# Patient Record
(demographics unavailable — no encounter records)

---

## 2025-02-28 NOTE — PHYSICAL EXAM
[Alert] : alert [Oriented x 3] : ~L oriented x 3 [Well Nourished] : well nourished [Conjunctiva Non-injected] : conjunctiva non-injected [No Visual Lymphadenopathy] : no visual  lymphadenopathy [No Clubbing] : no clubbing [No Edema] : no edema [No Bromhidrosis] : no bromhidrosis [No Chromhidrosis] : no chromhidrosis [FreeTextEntry3] : tense bulla overlying erythematous thin plaques on the arms, legs, neck, chest tense bulla and crusted erosions on lips erosions on soft palate scrapable yellow-white plaques on tongue

## 2025-02-28 NOTE — HISTORY OF PRESENT ILLNESS
[FreeTextEntry1] : npa - rash [de-identified] : Pt is a 82 year old F presenting for hospital follow up:  Initial Hx: - presented 2/3/25 to ED w vesiculobullous eruption on neck/chest. Pt had COVID 1 week before rash onset. No new meds. - was biopsied (HE and DIF), results pending. HSV/VZV negative.  Interim Hx: - on Prednisone 20mg BID x 2 weeks (last day today), worsening. Developing new blisters in mouth, on lips, on palms and arms/legs - not itchy but palms hurt from blisters  Meds: occasional Tylenol and Advil

## 2025-02-28 NOTE — HISTORY OF PRESENT ILLNESS
[FreeTextEntry1] : npa - rash [de-identified] : Pt is a 82 year old F presenting for hospital follow up:  Initial Hx: - presented 2/3/25 to ED w vesiculobullous eruption on neck/chest. Pt had COVID 1 week before rash onset. No new meds. - was biopsied (HE and DIF), results pending. HSV/VZV negative.  Interim Hx: - on Prednisone 20mg BID x 2 weeks (last day today), worsening. Developing new blisters in mouth, on lips, on palms and arms/legs - not itchy but palms hurt from blisters  Meds: occasional Tylenol and Advil

## 2025-02-28 NOTE — ASSESSMENT
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 time out performed. lidocaine with epinephrine used for anesthesia. punch biopsy performed with suture in place. patient tolerated procedure well without complications. aftercare instructions provided to patient. patient instructed to follow up in 2 weeks for suture removal.  #Neoplasm of Uncertain Behavior - site: R forearm distal - ddx: DIF -  r/o BP - risks/benefits/alternatives to punch biopsy discussed today and patient consented to procedure. time out performed. lidocaine with epinephrine used for anesthesia. punch biopsy performed with suture in place. patient tolerated procedure well without complications. aftercare instructions provided to patient. patient instructed to follow up in 2 weeks for suture removal.  RTC 2 weeks for SR and f/u

## 2025-03-21 NOTE — END OF VISIT
[] : Resident [Resident] : Resident [Time Spent: ___ minutes] : I have spent [unfilled] minutes of time on the encounter which excludes teaching and separately reported services.

## 2025-03-21 NOTE — ASSESSMENT
[FreeTextEntry1] : #Bullous pemphigoid, chronic, flaring   - Bx-proven  - 2/18/25 BP Abs negative but still favor BP  - Discussed all treatment options. Patient having difficulty tolerating current medications, maritza prednisone. Difficult to stay compliant and take pills daily. Has not started MMF yet due to indeterminant quant. Discussed off label dupi for BP, maritza as underlying eczematous below. Patient and daughter opt to start dupi today. If not improved enough on dupi, can consider MMF - start dupi as below - Previously on prednisone 30 mg BID --> now on 30 mg daily. would benefit from increased dose, but unable to tolerate side effects - Will decrease to Prednisone 20 mg daily for 3 weeks, then continue with 10 mg daily for 3 weeks  - SED systemic steroids were discussed including but not limited to HPA axis suppression, immunosuppression, mood changes, risk of bone fracture, GI ulcer, weight gain, fluid retention, hyperglycemia and glaucoma. Recommend calcium+vit D supplementation, pepcid - STOP doxycycline  - START clobetasol 0.05% ointment twice daily to affected areas for 2 weeks on/1 week off - Proper topical steroid use and side effects discussed, including cutaneous atrophy, telangiectasias, and striae. Avoid long-term use. - Few bullae deroofed today for pt comfort, cleaned with etoh, punctured with 18 gauge needle, tolerated well  #Eczema/Atopic dermatitis #Encounter for injection education - Dupixent  - gentle skin care, liberal bland emollients; can use TCS as needed - 2 Dupixent pens (600 mg) injected subcutaneously into L and R proximal thighs without complication  - Lot #: [id number]; Exp: 07/31/26 - Proper technique demonstrated and all questions answered, do not inject into any broken/inflamed skin - Next dose (maintenance: 300 mg) due in 2 weeks and then q2 weeks thereafter - Discussed removing Dupixent pen from fridge 30 minutes before use - Instructed pt to request refill for next and subsequent doses  - 2 additional sample pens given to pt while Dupixent approval underway via [employer] ([employer]tee)  Medication discussed as well as side effects including conjunctivitis, visual changes, dry eye/itch, hypersensitivity reaction, injection site reactions, herpes simplex infections, eosinophilia, arthralgia/arthritis, keratitis, and molluscum; avoidance of live vaccines while on the medication   #High risk medication use - Reordered Quantiferon today - will follow result. If indeterminant again, can get CXR and ID eval if plan for MMF - 2/18/25 CBC, CMP, Hep panel, Quantiferon reviewed   RTC 4-6 weeks

## 2025-03-21 NOTE — HISTORY OF PRESENT ILLNESS
[FreeTextEntry1] : F/u BP [de-identified] : Pt is an 82-year-old F presenting for hospital follow up:  1) Bullous pemphigoid, trunk, extremities - Pt noted weakness and polyuria from prednisone so she has been taking 30 mg daily (instead of twice daily) for the past 3 days but has now been flaring since - Otherwise has also been taking doxycycline one daily - Ran out of topicals - Reports itch and pain from bullae  - Denies mouth erosions but reports dry mouth   Initial Hx: - presented 2/3/25 to ED w vesiculobullous eruption on neck/chest. Pt had COVID 1 week before rash onset. No new meds. - was biopsied (HE and DIF), results pending. HSV/VZV negative.  Interim Hx: - on Prednisone 20mg BID x 2 weeks (last day today), worsening. Developing new blisters in mouth, on lips, on palms and arms/legs - not itchy but palms hurt from blisters  Meds: occasional Tylenol and Advil

## 2025-03-21 NOTE — PHYSICAL EXAM
[Alert] : alert [FreeTextEntry3] : General: well appearing person in nad, alert, pleasant Focused Skin Exam per patient preference: - Tense bulla overlying erythematous thin plaques on proximal extremities and trunk  - Oral mucosa and eyes clear - Pink scaly plaques on trunk and extremities, xerosis of legs

## 2025-05-08 NOTE — ASSESSMENT
[FreeTextEntry1] : #Favor Bullous pemphigoid, chronic, flaring   - Bx w subepidermal blisters w NEUTROPHILS  - 2/18/25 BP Abs negative  - Will order IIF to confirm diagnosis of BP and r/o EBA, bullous lupus: [facility] basement membrane zone antibody test - discussed all treatment options - c/w Dupixent (started April 2025) q 2 weeks, SED - c/w Prednisone taper 5mg daily until f/u- SED systemic steroids were discussed including but not limited to HPA axis suppression, immunosuppression, mood changes, risk of bone fracture, GI ulcer, weight gain, fluid retention, hyperglycemia and glaucoma. Recommend calcium+vit D supplementation, pepcid - START CellCept 500mg BID; SED including GI disturbance, including peptic ulceration (which is a relative contraindication to this medication), reversible bone marrow suppression, and possible teratogenic effect.. CBC and CMP baseline in HIE - Recheck CBC and diff at next visit in 2 weeks - c/w clobetasol 0.05% ointment twice daily to affected areas for 2 weeks on/1 week off - Proper topical steroid use and side effects discussed, including cutaneous atrophy, telangiectasias, and striae. Avoid long-term use.   #High risk medication use - QG negative 04/2025 during hospital stay (prev indeterminate i/s/o prednisone use) - will RECHECK CBC, CMP in 2 weeks after starting CellCept - 2/18/25 CBC, CMP, Hep panel, Quantiferon reviewed   RTC 2 weeks

## 2025-05-08 NOTE — HISTORY OF PRESENT ILLNESS
[FreeTextEntry1] : F/u BP [de-identified] : Pt is an 82-year-old F presenting for f/u:  1) AIBD- favor Bullous pemphigoid, trunk, extremities - started Dupixent 6 weeks ago, notes some improvement but still getting smaller active lesions on legs, not big blisters anymore - on Pred taper, was recently hospitalized for hyperglycemia (glucose 500s) while on Pred 10mg. Glucose is now well controlled w metformin and insulin. Currently taking Prednisone 5mg- will have for 1 more week - denies lesions in mouth and genitals  Initial Hx: - presented 2/3/25 to ED w vesiculobullous eruption on neck/chest. Pt had COVID 1 week before rash onset. No new meds. - was biopsied (HE and DIF), results pending. HSV/VZV negative.  Interim Hx: - on Prednisone 20mg BID x 2 weeks (last day today), worsening. Developing new blisters in mouth, on lips, on palms and arms/legs - not itchy but palms hurt from blisters  Meds: occasional Tylenol and Advil

## 2025-05-08 NOTE — PHYSICAL EXAM
[Alert] : alert [FreeTextEntry3] : General: well appearing person in nad, alert, pleasant Focused Skin Exam per patient preference: - Few tense cloudy bulla overlying erythematous thin plaques on thighs and lower legs, improved from prior - Oral mucosa and eyes clear - Pink scaly plaques on trunk and extremities, xerosis of legs

## 2025-05-30 NOTE — HISTORY OF PRESENT ILLNESS
[FreeTextEntry1] : F/u BP [de-identified] : Pt is an 82-year-old F presenting for f/u:  1) AIBD- favor p200 or antibody negative pemphigoid on trunk, extremities  Patient notes significant improvement. On dupixent for about 8 weeks now and started cellcept 500BID 2 weeks ago. tolerating well with no GI problems. She has been off pred for a couple weeks now per her niece, not flaring.  Hx from 5/8/2025: - started Dupixent 6 weeks ago, notes some improvement but still getting smaller active lesions on legs, not big blisters anymore - on Pred taper, was recently hospitalized for hyperglycemia (glucose 500s) while on Pred 10mg. Glucose is now well controlled w metformin and insulin. Currently taking Prednisone 5mg- will have for 1 more week - denies lesions in mouth and genitals  Initial Hx: - presented 2/3/25 to ED w vesiculobullous eruption on neck/chest. Pt had COVID 1 week before rash onset. No new meds. - was biopsied (HE and DIF), results pending. HSV/VZV negative.  Interim Hx: - on Prednisone 20mg BID x 2 weeks (last day today), worsening. Developing new blisters in mouth, on lips, on palms and arms/legs - not itchy but palms hurt from blisters  Meds: occasional Tylenol and Advil

## 2025-05-30 NOTE — PHYSICAL EXAM
[Alert] : alert [FreeTextEntry3] : General: well appearing person in nad, alert, pleasant Focused Skin Exam per patient preference: - Few healing erythematous thin plaques and pink patches at sites of prior bullae on thighs and lower legs, few scattered erosions, improved from prior, pt deferred exam of trunk and states it is the same as her legs and doing well - Oral mucosa and eyes clear  - xerosis of legs

## 2025-05-30 NOTE — ASSESSMENT
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  - discussed all treatment options - c/w Dupixent (started 2025) q 2 weeks, SED - s/p Prednisone taper d/c about 2 weeks ago.  - increase CellCept to 1.5mg in the am and 1000mg in the evening (or vice versa - 1500mg daily total) ; SED including GI disturbance, including peptic ulceration (which is a relative contraindication to this medication), reversible bone marrow suppression, and possible teratogenic effect.. CBC and CMP baseline in HIE and will check again today - Recheck CBC and diff at next visit in 2 weeks - c/w clobetasol 0.05% ointment twice daily to affected areas for 2 weeks on/1 week off - Proper topical steroid use and side effects discussed, including cutaneous atrophy, telangiectasias, and striae. Avoid long-term use.   #High risk medication use - QG negative 2025 during hospital stay (prev indeterminate i/s/o prednisone use) - 25 CBC, CMP, Hep panel, Quantiferon reviewed  - 25 CBC and CMP reviewed- stable, will repeat today - will RECHECK CBC, CMP in 2 weeks after increase in dose of CellCept  RTC 2 weeks

## 2025-05-30 NOTE — HISTORY OF PRESENT ILLNESS
[FreeTextEntry1] : F/u BP [de-identified] : Pt is an 82-year-old F presenting for f/u:  1) AIBD- favor p200 or antibody negative pemphigoid on trunk, extremities  Patient notes significant improvement. On dupixent for about 8 weeks now and started cellcept 500BID 2 weeks ago. tolerating well with no GI problems. She has been off pred for a couple weeks now per her niece, not flaring.  Hx from 5/8/2025: - started Dupixent 6 weeks ago, notes some improvement but still getting smaller active lesions on legs, not big blisters anymore - on Pred taper, was recently hospitalized for hyperglycemia (glucose 500s) while on Pred 10mg. Glucose is now well controlled w metformin and insulin. Currently taking Prednisone 5mg- will have for 1 more week - denies lesions in mouth and genitals  Initial Hx: - presented 2/3/25 to ED w vesiculobullous eruption on neck/chest. Pt had COVID 1 week before rash onset. No new meds. - was biopsied (HE and DIF), results pending. HSV/VZV negative.  Interim Hx: - on Prednisone 20mg BID x 2 weeks (last day today), worsening. Developing new blisters in mouth, on lips, on palms and arms/legs - not itchy but palms hurt from blisters  Meds: occasional Tylenol and Advil

## 2025-06-13 NOTE — HISTORY OF PRESENT ILLNESS
[FreeTextEntry1] : F/u BP [de-identified] : Pt is an 82-year-old F presenting for f/u:  1) AIBD- favor p200 or antibody negative pemphigoid on trunk, extremities  Patient notes significant improvement,  but starting to flare again. On dupixent for about 10 weeks now and increased cellcept 1500mg 2 weeks ago. tolerating well with no GI problems. She has been off pred for a over a month now per her niece. Noticing legs are itchy and starting to get little blisters. Unclear if using clobetasol  Hx from 5/8/2025: - started Dupixent 6 weeks ago, notes some improvement but still getting smaller active lesions on legs, not big blisters anymore - on Pred taper, was recently hospitalized for hyperglycemia (glucose 500s) while on Pred 10mg. Glucose is now well controlled w metformin and insulin. Currently taking Prednisone 5mg- will have for 1 more week - denies lesions in mouth and genitals  Initial Hx: - presented 2/3/25 to ED w vesiculobullous eruption on neck/chest. Pt had COVID 1 week before rash onset. No new meds. - was biopsied (HE and DIF), results pending. HSV/VZV negative.  Interim Hx: - on Prednisone 20mg BID x 2 weeks (last day today), worsening. Developing new blisters in mouth, on lips, on palms and arms/legs - not itchy but palms hurt from blisters  Meds: occasional Tylenol and Advil

## 2025-06-13 NOTE — ASSESSMENT
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  - discussed all treatment options - c/w Dupixent (started 2025) q 2 weeks, SED - s/p Prednisone taper d/c about 2 weeks ago.  - increase CellCept to 2mg in the am and 1000mg in the evening ; SED including GI disturbance, including peptic ulceration (which is a relative contraindication to this medication), reversible bone marrow suppression, and possible teratogenic effect.. CBC and CMP baseline in HIE and will check again today - Recheck CBC and diff at next visit in 2 weeks - restart clobetasol 0.05% ointment twice daily to affected areas for 2 weeks on/1 week off - Proper topical steroid use and side effects discussed, including cutaneous atrophy, telangiectasias, and striae. Avoid long-term use. - mupirocin to open erosion on thigh covered with nonstick until healed over   #High risk medication use - QG negative 2025 during hospital stay (prev indeterminate i/s/o prednisone use) - 25 CBC, CMP, Hep panel, Quantiferon reviewed  - 25 CBC and CMP reviewed- stable, - 25: CMP wnl, CBC with slightly low Hb at 10, will recheck today - will RECHECK CBC, CMP today  RTC 2 weeks

## 2025-06-13 NOTE — PHYSICAL EXAM
[Alert] : alert [FreeTextEntry3] : General: well appearing person in nad, alert, pleasant Focused Skin Exam per patient preference: - Few healing erythematous thin plaques and pink patches at sites of prior bullae on thighs and lower legs, few scattered erosions, improved from prior, pt deferred exam of trunk and states it is the same as her legs and doing well - Oral mucosa and eyes clear  - Thighs with few small vescicles and erosions - One open erosion on L dorsal foot

## 2025-07-28 NOTE — PHYSICAL EXAM
[Alert] : alert [FreeTextEntry3] : General: well appearing person in nad, alert, pleasant Focused Skin Exam per patient preference: - no active blisters noted

## 2025-07-28 NOTE — HISTORY OF PRESENT ILLNESS
[FreeTextEntry1] : F/u BP [de-identified] : Pt is an 82-year-old F presenting for f/u:  1) AIBD- favor p200 or antibody negative pemphigoid on trunk, extremities  Patient notes significant improvement, only some blotchy spots on legs left, no blisters. On dupixent for about 12 weeks now and cellcept 2000mg- tolerating well with no GI problems. Using clobetasol once a day with breaks. No issues with eyes, mouth, or groin. No blisters since LV, but is itchy. Will be getting blood work drawn with her pcp appt in August  Hx from 5/8/2025: - started Dupixent 6 weeks ago, notes some improvement but still getting smaller active lesions on legs, not big blisters anymore - on Pred taper, was recently hospitalized for hyperglycemia (glucose 500s) while on Pred 10mg. Glucose is now well controlled w metformin and insulin. Currently taking Prednisone 5mg- will have for 1 more week - denies lesions in mouth, eyes, and genitals  Initial Hx: - presented 2/3/25 to ED w vesiculobullous eruption on neck/chest. Pt had COVID 1 week before rash onset. No new meds. - was biopsied (HE and DIF), results pending. HSV/VZV negative.  Interim Hx: - on Prednisone 20mg BID x 2 weeks (last day today), worsening. Developing new blisters in mouth, on lips, on palms and arms/legs - not itchy but palms hurt from blisters  Meds: occasional Tylenol and Advil

## 2025-07-28 NOTE — ASSESSMENT
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  - discussed all treatment options - c/w Dupixent (started 2025) q 2 weeks, SED. As still itchy, if not improved at [facility], consider increasing injections to qweekly.  - s/p Prednisone taper d/c in May 2025 - c/w MMF 2mg in the am and 1000mg in the evening ; SED including GI disturbance, including peptic ulceration (which is a relative contraindication to this medication), reversible bone marrow suppression, and possible teratogenic effect.. CBC and CMP baseline in HIE and will check again today - Recheck CBC and diff- discussed low Hb, can increase green leafy vegenetables as slight macrocytosis on prev labs.Will check with her next pcp appt next month - increase clobetasol 0.05% ointment to twice daily to affected areas for 2 weeks on/1 week off - Proper topical steroid use and side effects discussed, including cutaneous atrophy, telangiectasias, and striae. Avoid long-term use.   #High risk medication use - QG negative 2025 during hospital stay (prev indeterminate i/s/o prednisone use) - 25 CBC, CMP, Hep panel, Quantiferon reviewed  - 25 CBC and CMP reviewed- stable, - 25: CMP wnl, CBC with slightly low Hb at 10, will recheck today - 25: CMP wnl; CBC with Hb 10.5- see above, will recheck and f/u with pcp  RTC 4 weeks, tele ok  This visit was conducted via live synchronous audio/video telehealth with the patient and provider. The patient attested to being located in [facility] where the provider is also currently located and licensed to practice medicine. Verbal consent was obtained for this telemedicine encounter. Risks and benefits of using Telehealth services has been discussed with the patient. The patient has been given ample opportunity to discuss any questions regarding [facility]'s Telehealth services. All of the patient's questions were answered to his/her satisfaction.